# Patient Record
Sex: MALE | Race: WHITE | NOT HISPANIC OR LATINO | URBAN - METROPOLITAN AREA
[De-identification: names, ages, dates, MRNs, and addresses within clinical notes are randomized per-mention and may not be internally consistent; named-entity substitution may affect disease eponyms.]

---

## 2017-11-07 RX ORDER — HALOPERIDOL DECANOATE 100 MG/ML
200 INJECTION INTRAMUSCULAR
Qty: 0 | Refills: 0 | DISCHARGE
Start: 2017-11-07

## 2019-05-17 ENCOUNTER — INPATIENT (INPATIENT)
Facility: HOSPITAL | Age: 39
LOS: 6 days | Discharge: HOME | End: 2019-05-24
Attending: PSYCHIATRY & NEUROLOGY | Admitting: PSYCHIATRY & NEUROLOGY
Payer: MEDICAID

## 2019-05-17 VITALS
HEIGHT: 74 IN | TEMPERATURE: 98 F | OXYGEN SATURATION: 100 % | WEIGHT: 220.02 LBS | DIASTOLIC BLOOD PRESSURE: 62 MMHG | SYSTOLIC BLOOD PRESSURE: 138 MMHG | RESPIRATION RATE: 14 BRPM

## 2019-05-17 DIAGNOSIS — R69 ILLNESS, UNSPECIFIED: ICD-10-CM

## 2019-05-17 DIAGNOSIS — F14.10 COCAINE ABUSE, UNCOMPLICATED: ICD-10-CM

## 2019-05-17 DIAGNOSIS — F10.920 ALCOHOL USE, UNSPECIFIED WITH INTOXICATION, UNCOMPLICATED: ICD-10-CM

## 2019-05-17 DIAGNOSIS — F29 UNSPECIFIED PSYCHOSIS NOT DUE TO A SUBSTANCE OR KNOWN PHYSIOLOGICAL CONDITION: ICD-10-CM

## 2019-05-17 DIAGNOSIS — F12.10 CANNABIS ABUSE, UNCOMPLICATED: ICD-10-CM

## 2019-05-17 LAB
ALBUMIN SERPL ELPH-MCNC: 3.9 G/DL — SIGNIFICANT CHANGE UP (ref 3.3–5)
ALP SERPL-CCNC: 58 U/L — SIGNIFICANT CHANGE UP (ref 40–120)
ALT FLD-CCNC: 18 U/L — SIGNIFICANT CHANGE UP (ref 12–78)
AMPHET UR-MCNC: NEGATIVE — SIGNIFICANT CHANGE UP
ANION GAP SERPL CALC-SCNC: 6 MMOL/L — SIGNIFICANT CHANGE UP (ref 5–17)
APAP SERPL-MCNC: < 2 UG/ML (ref 10–30)
AST SERPL-CCNC: 13 U/L — LOW (ref 15–37)
BARBITURATES UR SCN-MCNC: NEGATIVE — SIGNIFICANT CHANGE UP
BASOPHILS # BLD AUTO: 0.08 K/UL — SIGNIFICANT CHANGE UP (ref 0–0.2)
BASOPHILS NFR BLD AUTO: 0.8 % — SIGNIFICANT CHANGE UP (ref 0–2)
BENZODIAZ UR-MCNC: NEGATIVE — SIGNIFICANT CHANGE UP
BILIRUB SERPL-MCNC: 0.2 MG/DL — SIGNIFICANT CHANGE UP (ref 0.2–1.2)
BUN SERPL-MCNC: 8 MG/DL — SIGNIFICANT CHANGE UP (ref 7–23)
CALCIUM SERPL-MCNC: 8.6 MG/DL — SIGNIFICANT CHANGE UP (ref 8.5–10.1)
CHLORIDE SERPL-SCNC: 114 MMOL/L — HIGH (ref 96–108)
CO2 SERPL-SCNC: 22 MMOL/L — SIGNIFICANT CHANGE UP (ref 22–31)
COCAINE METAB.OTHER UR-MCNC: NEGATIVE — SIGNIFICANT CHANGE UP
CREAT SERPL-MCNC: 1.14 MG/DL — SIGNIFICANT CHANGE UP (ref 0.5–1.3)
EOSINOPHIL # BLD AUTO: 0.31 K/UL — SIGNIFICANT CHANGE UP (ref 0–0.5)
EOSINOPHIL NFR BLD AUTO: 3.2 % — SIGNIFICANT CHANGE UP (ref 0–6)
ETHANOL SERPL-MCNC: 213 MG/DL — HIGH (ref 0–10)
GLUCOSE SERPL-MCNC: 90 MG/DL — SIGNIFICANT CHANGE UP (ref 70–99)
HCT VFR BLD CALC: 38.6 % — LOW (ref 39–50)
HGB BLD-MCNC: 12.7 G/DL — LOW (ref 13–17)
IMM GRANULOCYTES NFR BLD AUTO: 0.2 % — SIGNIFICANT CHANGE UP (ref 0–1.5)
LYMPHOCYTES # BLD AUTO: 1.75 K/UL — SIGNIFICANT CHANGE UP (ref 1–3.3)
LYMPHOCYTES # BLD AUTO: 18.1 % — SIGNIFICANT CHANGE UP (ref 13–44)
MCHC RBC-ENTMCNC: 30.2 PG — SIGNIFICANT CHANGE UP (ref 27–34)
MCHC RBC-ENTMCNC: 32.9 GM/DL — SIGNIFICANT CHANGE UP (ref 32–36)
MCV RBC AUTO: 91.7 FL — SIGNIFICANT CHANGE UP (ref 80–100)
METHADONE UR-MCNC: NEGATIVE — SIGNIFICANT CHANGE UP
MONOCYTES # BLD AUTO: 0.41 K/UL — SIGNIFICANT CHANGE UP (ref 0–0.9)
MONOCYTES NFR BLD AUTO: 4.2 % — SIGNIFICANT CHANGE UP (ref 2–14)
NEUTROPHILS # BLD AUTO: 7.11 K/UL — SIGNIFICANT CHANGE UP (ref 1.8–7.4)
NEUTROPHILS NFR BLD AUTO: 73.5 % — SIGNIFICANT CHANGE UP (ref 43–77)
OPIATES UR-MCNC: NEGATIVE — SIGNIFICANT CHANGE UP
PCP SPEC-MCNC: SIGNIFICANT CHANGE UP
PCP UR-MCNC: NEGATIVE — SIGNIFICANT CHANGE UP
PLATELET # BLD AUTO: 278 K/UL — SIGNIFICANT CHANGE UP (ref 150–400)
POTASSIUM SERPL-MCNC: 3.8 MMOL/L — SIGNIFICANT CHANGE UP (ref 3.5–5.3)
POTASSIUM SERPL-SCNC: 3.8 MMOL/L — SIGNIFICANT CHANGE UP (ref 3.5–5.3)
PROT SERPL-MCNC: 6.8 GM/DL — SIGNIFICANT CHANGE UP (ref 6–8.3)
RBC # BLD: 4.21 M/UL — SIGNIFICANT CHANGE UP (ref 4.2–5.8)
RBC # FLD: 13 % — SIGNIFICANT CHANGE UP (ref 10.3–14.5)
SALICYLATES SERPL-MCNC: 12.6 MG/DL — SIGNIFICANT CHANGE UP (ref 2.8–20)
SODIUM SERPL-SCNC: 142 MMOL/L — SIGNIFICANT CHANGE UP (ref 135–145)
THC UR QL: NEGATIVE — SIGNIFICANT CHANGE UP
WBC # BLD: 9.68 K/UL — SIGNIFICANT CHANGE UP (ref 3.8–10.5)
WBC # FLD AUTO: 9.68 K/UL — SIGNIFICANT CHANGE UP (ref 3.8–10.5)

## 2019-05-17 PROCEDURE — 99285 EMERGENCY DEPT VISIT HI MDM: CPT

## 2019-05-17 PROCEDURE — 90792 PSYCH DIAG EVAL W/MED SRVCS: CPT

## 2019-05-17 PROCEDURE — 93010 ELECTROCARDIOGRAM REPORT: CPT

## 2019-05-17 RX ORDER — NICOTINE POLACRILEX 2 MG
1 GUM BUCCAL ONCE
Refills: 0 | Status: COMPLETED | OUTPATIENT
Start: 2019-05-17 | End: 2019-05-17

## 2019-05-17 RX ORDER — TRIHEXYPHENIDYL HCL 2 MG
2 TABLET ORAL
Refills: 0 | Status: DISCONTINUED | OUTPATIENT
Start: 2019-05-17 | End: 2019-05-24

## 2019-05-17 RX ORDER — LITHIUM CARBONATE 300 MG/1
300 TABLET, EXTENDED RELEASE ORAL
Refills: 0 | Status: DISCONTINUED | OUTPATIENT
Start: 2019-05-17 | End: 2019-05-24

## 2019-05-17 RX ORDER — HYDROXYZINE HCL 10 MG
50 TABLET ORAL ONCE
Refills: 0 | Status: COMPLETED | OUTPATIENT
Start: 2019-05-17 | End: 2019-05-17

## 2019-05-17 RX ORDER — HALOPERIDOL DECANOATE 100 MG/ML
5 INJECTION INTRAMUSCULAR EVERY 6 HOURS
Refills: 0 | Status: DISCONTINUED | OUTPATIENT
Start: 2019-05-17 | End: 2019-05-24

## 2019-05-17 RX ORDER — OLANZAPINE 15 MG/1
20 TABLET, FILM COATED ORAL AT BEDTIME
Refills: 0 | Status: DISCONTINUED | OUTPATIENT
Start: 2019-05-17 | End: 2019-05-21

## 2019-05-17 RX ADMIN — OLANZAPINE 20 MILLIGRAM(S): 15 TABLET, FILM COATED ORAL at 22:24

## 2019-05-17 RX ADMIN — Medication 2 MILLIGRAM(S): at 22:24

## 2019-05-17 RX ADMIN — LITHIUM CARBONATE 300 MILLIGRAM(S): 300 TABLET, EXTENDED RELEASE ORAL at 22:24

## 2019-05-17 RX ADMIN — Medication 50 MILLIGRAM(S): at 17:47

## 2019-05-17 RX ADMIN — Medication 1 PATCH: at 15:50

## 2019-05-17 NOTE — ED PROVIDER NOTE - CONSTITUTIONAL, MLM
normal... Tangential, paranoid, well nourished, awake, alert, oriented to person, place, time/situation and in no apparent distress.
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
## 2019-05-17 NOTE — ED PROVIDER NOTE - OBJECTIVE STATEMENT
38 y/o male with a PMHx of asthma, anxiety, depression, emphysema, paranoid behavior, SI, presents to the ED c/o SI/HI. +EtOH use and marijuana use PTA. Pt notes he is paranoid. Pt notes he feels like people are watching. No other complaints at this time.

## 2019-05-17 NOTE — ED ADULT TRIAGE NOTE - CHIEF COMPLAINT QUOTE
Pt ambulatory to ED and states, "I am going to kill myself or someone else". Pt states he drank ETOH and smoked marijuana PTA. Called for 1:1 and main bed  Pt flags as V3 in computer upon registration and security notified

## 2019-05-17 NOTE — ED BEHAVIORAL HEALTH ASSESSMENT NOTE - RISK ASSESSMENT
Risk factor, substance abuse, h/o aggression and SI, h/o noncompliance, legal abuse, trauma, SA, white.  Protective factors: on AOT with ACT team.  Help seeking.

## 2019-05-17 NOTE — ED ADULT NURSE NOTE - OBJECTIVE STATEMENT
Patient with AOB states he wants to kill himself and states that he would shoot himself to end his life. patient admits that he wants to harm other people but no one in the emergency department. Patient states he has come here for help and wants to be admitted.

## 2019-05-17 NOTE — ED BEHAVIORAL HEALTH ASSESSMENT NOTE - CURRENT MEDICATION
per Pt., unconfirmed with ACT team, awaiting return call  Zyprexa 15 mg am and 20 mg hs, Artane 2mg am and 2 mg hs, Lithium 300 q12, Neurontin 700 am and 700 pm
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
## 2019-05-17 NOTE — ED BEHAVIORAL HEALTH ASSESSMENT NOTE - OTHER PAST PSYCHIATRIC HISTORY (INCLUDE DETAILS REGARDING ONSET, COURSE OF ILLNESS, INPATIENT/OUTPATIENT TREATMENT)
ACT Team Errol??, message left for return call    (Pt reported was [facility] but they have no record of him there)  [facility] noted as contact who was contacted bu number updated and no longer in chart.  >100 psych admissions including 1 year at [facility] in 2016, now on AOT

## 2019-05-17 NOTE — ED BEHAVIORAL HEALTH ASSESSMENT NOTE - DESCRIPTION
Cooperative, watching TV, no acute distress or behavioral issues. Heart Murmur, Fx rib age30 single, lives in room with mother, multiple arrents and incarcerations, Attended Jordi CUTLER

## 2019-05-17 NOTE — ED ADULT NURSE NOTE - NSIMPLEMENTINTERV_GEN_ALL_ED
Implemented All Universal Safety Interventions:  [place] to call system. Call bell, personal items and telephone within reach. Instruct patient to call for assistance. Room bathroom lighting operational. Non-slip footwear when patient is off stretcher. Physically safe environment: no spills, clutter or unnecessary equipment. Stretcher in lowest position, wheels locked, appropriate side rails in place.

## 2019-05-17 NOTE — ED BEHAVIORAL HEALTH ASSESSMENT NOTE - NS ED BHA PLAN PSYCHIATRIC ISSUES2 FT
will restart zyprexa, lithium. haldol/ativan prn agitation. primary team may consider restarting clozaril in lieu of zyprexa

## 2019-05-18 LAB
CHOLEST SERPL-MCNC: 132 MG/DL — SIGNIFICANT CHANGE UP (ref 10–199)
HBA1C BLD-MCNC: 4.7 % — SIGNIFICANT CHANGE UP (ref 4–5.6)
HDLC SERPL-MCNC: 76 MG/DL — SIGNIFICANT CHANGE UP
LIPID PNL WITH DIRECT LDL SERPL: 48 MG/DL — SIGNIFICANT CHANGE UP
TOTAL CHOLESTEROL/HDL RATIO MEASUREMENT: 1.7 RATIO — LOW (ref 3.4–9.6)
TRIGL SERPL-MCNC: 40 MG/DL — SIGNIFICANT CHANGE UP (ref 10–149)

## 2019-05-18 PROCEDURE — 99222 1ST HOSP IP/OBS MODERATE 55: CPT

## 2019-05-18 RX ORDER — IPRATROPIUM/ALBUTEROL SULFATE 18-103MCG
3 AEROSOL WITH ADAPTER (GRAM) INHALATION EVERY 6 HOURS
Refills: 0 | Status: DISCONTINUED | OUTPATIENT
Start: 2019-05-18 | End: 2019-05-24

## 2019-05-18 RX ORDER — THIAMINE MONONITRATE (VIT B1) 100 MG
100 TABLET ORAL DAILY
Refills: 0 | Status: DISCONTINUED | OUTPATIENT
Start: 2019-05-18 | End: 2019-05-24

## 2019-05-18 RX ORDER — CLOZAPINE 150 MG/1
25 TABLET, ORALLY DISINTEGRATING ORAL AT BEDTIME
Refills: 0 | Status: COMPLETED | OUTPATIENT
Start: 2019-05-18 | End: 2019-05-18

## 2019-05-18 RX ORDER — FOLIC ACID 0.8 MG
1 TABLET ORAL DAILY
Refills: 0 | Status: DISCONTINUED | OUTPATIENT
Start: 2019-05-18 | End: 2019-05-24

## 2019-05-18 RX ORDER — CLOZAPINE 150 MG/1
50 TABLET, ORALLY DISINTEGRATING ORAL AT BEDTIME
Refills: 0 | Status: DISCONTINUED | OUTPATIENT
Start: 2019-05-19 | End: 2019-05-20

## 2019-05-18 RX ADMIN — Medication 2 MILLIGRAM(S): at 18:09

## 2019-05-18 RX ADMIN — Medication 2 MILLIGRAM(S): at 20:33

## 2019-05-18 RX ADMIN — LITHIUM CARBONATE 300 MILLIGRAM(S): 300 TABLET, EXTENDED RELEASE ORAL at 09:58

## 2019-05-18 RX ADMIN — OLANZAPINE 20 MILLIGRAM(S): 15 TABLET, FILM COATED ORAL at 23:30

## 2019-05-18 RX ADMIN — Medication 2 MILLIGRAM(S): at 09:58

## 2019-05-18 RX ADMIN — Medication 1 MILLIGRAM(S): at 10:25

## 2019-05-18 RX ADMIN — CLOZAPINE 25 MILLIGRAM(S): 150 TABLET, ORALLY DISINTEGRATING ORAL at 20:34

## 2019-05-18 RX ADMIN — Medication 2 MILLIGRAM(S): at 14:52

## 2019-05-18 RX ADMIN — HALOPERIDOL DECANOATE 5 MILLIGRAM(S): 100 INJECTION INTRAMUSCULAR at 14:52

## 2019-05-18 RX ADMIN — HALOPERIDOL DECANOATE 5 MILLIGRAM(S): 100 INJECTION INTRAMUSCULAR at 20:32

## 2019-05-18 RX ADMIN — LITHIUM CARBONATE 300 MILLIGRAM(S): 300 TABLET, EXTENDED RELEASE ORAL at 18:09

## 2019-05-18 NOTE — PROGRESS NOTE BEHAVIORAL HEALTH - NSBHFUPINTERVALHXFT_PSY_A_CORE
Patient a 38 y/o single  male, unemployed, domiciled with mother, unemployed, on SSI, hx of Schizophrenia/SAD, multiple past In-Patient Psychiatric admissions, including [facility] Admission for 8 months, hx of Polysubstance abuse, was BIB/Self due to disorganized behavior.    He endorsed that he woke up, did not like what he felt, took a bus as he was also hearing voices, and thus was unable to care for self so he took a bus and came here. He endorsed that he drinks Alcohol daily and he drinks to help him lower his voices, he hears voices all the time, telling him derogatory things, alcohol reduces the intensity of the voices, but it continues to persist. He also smoked Cocaine, last cocaine use was 2 days earlier, smokes cocaine once a week, and continues to say that he is paranoid and suspicious, paranoid to the point that he feels everything is after him, " I can't pass around the neighborhood, people talking about me, I hear what they say, I feel that I am in a movie show as if everybody talking  about me. He further added that he always feel there are aliens around him, and they have the same flesh  as us etc. he was hospitalized multiple times in the past. he added that the only meds helps him is Clozapine, Clozapine started on 25 mg HS for today and was increased to Clozapine 50 mg tomorrow.
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
## 2019-05-18 NOTE — H&P ADULT - NSHPPHYSICALEXAM_GEN_ALL_CORE
Vital Signs Last 24 Hrs  T(C): 37 (18 May 2019 16:36), Max: 37 (18 May 2019 07:58)  T(F): 98.6 (18 May 2019 16:36), Max: 98.6 (18 May 2019 07:58)  HR: 83 (18 May 2019 20:13) (75 - 96)  BP: 122/82 (18 May 2019 20:13) (122/81 - 122/82)  BP(mean): 91 (18 May 2019 20:13) (90 - 91)  RR: 16 (18 May 2019 20:13) (16 - 16)  SpO2: 98% (18 May 2019 20:13) (98% - 100%)

## 2019-05-18 NOTE — PROGRESS NOTE BEHAVIORAL HEALTH - NSBHFUPADDHPIFT_PSY_A_CORE
Patient a 38 y/o single  male, unemployed, domiciled with mother, unemployed, on SSI, hx of Schizophrenia/SAD, multiple past In-Patient Psychiatric admissions, including [facility] Admission for 8 months, hx of Polysubstance abuse, was BIB/Self due to disorganized behavior.    He endorsed that he woke up, did not like what he felt, took a bus as he was also hearing voices, and thus was unable to care for self so he took a bus and came here. He endorsed that he drinks Alcohol daily and he drinks to help him lower his voices, he hears voices all the time, telling him derogatory things, alcohol reduces the intensity of the voices, but it continues to persist. He also smoked Cocaine, last cocaine use was 2 days earlier, smokes cocaine once a week, and continues to say that he is paranoid and suspicious, paranoid to the point that he feels everything is after him, " I can't pass around the neighborhood, people talking about me, I hear what they say, I feel that I am in a movie show as if everybody talking  about me. He further added that he always feel there are aliens around him, and they have the same flesh  as us etc. he was hospitalized multiple times in the past. he added that the only meds helps him is Clozapine, Clozapine started on 25 mg HS for today and was increased to Clozapine 50 mg tomorrow.
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
## 2019-05-18 NOTE — H&P ADULT - ATTENDING COMMENTS
40 yo M with PMH significant for Paranoid schizophrenia, schizoaffective disorder, Alcohol abuse, cocaine use, THC use, Asthma admitted to [facility] for eval. of Agitation, paranoia, SI and HI. Pt is resting comfortably in bed. Denies headache, dizziness chest pain, palpitations or SOB. Denies fever, chills, cough, dysuria or diarrhea. On arrival BAL was 213. Pt reported that He drinks 12-14 beers per day. Pt reported Hx of DT's in the past. Medicine consult is called for medical Mx. Pt was seen and examined in presence of Night Nursing Assistant. Pt denies any past Hx of chest pain, angina, MI or CAD.   ROS: as above.  On exam: as above.     A/P:    # Schizophrenia/Schizoaffective disorder/ Paranoia/SI/HI/Psych problems  - Manage as per primary psych team    # Anemia likely 2/2 to alcohol use  - H/H: 12.7/38.6  - Folate and Vitamin b12 level  - Denies any GI or  bleeding    # Hx of Alcohol abuse  - On CIWA protocol, ordered by Psych team  - MVI/Thiamine/FA  _ NO s/s of withdrawal at present    # Hx of Cocaine use  - Denies nay chest pain, palpitations or SOB.  - Counselled on cessation, education provided  - EKG NSR    # Hx of THC use  - Counselled on cessation, education provided    # Hx of Asthma  - Stable  - PRN nebs    # DVT Ppx  - Encourage Ambulation

## 2019-05-18 NOTE — H&P ADULT - ASSESSMENT
40 yo M with PMH significant for Paranoid schizophrenia, schizoaffective disorder, Alcohol abuse, cocaine use, THC use, Asthma admitted to [facility] for eval. of Agitation, paranoia, SI and HI.     # Schizophrenia/Schizoaffective disorder/ Paranoia/SI/HI/Psych problems  - Manage as per primary psych team    # Anemia likely 2/2 to alcohol use  - H/H: 12.7/38.6  - Folate and Vitamin b12 level  - Denies any GI or  bleeding    # Hx of Alcohol abuse  - On CIWA protocol, ordered by Psych team  - MVI/Thiamine/FA  _ NO s/s of withdrawal at present    # Hx of Cocaine use  - Denies nay chest pain, palpitations or SOB.  - Counselled on cessation, education provided  - EKG NSR    # Hx of THC use  - Counselled on cessation, education provided    # Hx of Asthma  - Stable  - PRN nebs    # DVT Ppx  - Encourage Ambulation    IMPROVE VTE Individual Risk Assessment    RISK                                                                Points    [  ] Previous VTE                                                  3    [  ] Thrombophilia                                               2    [  ] Lower limb paralysis                                      2        (unable to hold up >15 seconds)      [  ] Current Cancer                                              2         (within 6 months)    [  ] Immobilization > 24 hrs                                1    [  ] ICU/CCU stay > 24 hours                              1    [  ] Age > 60                                                      1    IMPROVE VTE Score ____0_____    IMPROVE Score 0-1: Low Risk, No VTE prophylaxis required for most patients, encourage ambulation.   IMPROVE Score 2-3: At risk, pharmacologic VTE prophylaxis is indicated for most patients (in the absence of a contraindication)  IMPROVE Score > or = 4: High Risk, pharmacologic VTE prophylaxis is indicated for most patients (in the absence of a contraindication)      Case d/w Dr. Victoria

## 2019-05-19 LAB
FOLATE SERPL-MCNC: 16.7 NG/ML — SIGNIFICANT CHANGE UP
VIT B12 SERPL-MCNC: 576 PG/ML — SIGNIFICANT CHANGE UP (ref 232–1245)

## 2019-05-19 PROCEDURE — 99231 SBSQ HOSP IP/OBS SF/LOW 25: CPT

## 2019-05-19 RX ADMIN — OLANZAPINE 20 MILLIGRAM(S): 15 TABLET, FILM COATED ORAL at 22:38

## 2019-05-19 RX ADMIN — HALOPERIDOL DECANOATE 5 MILLIGRAM(S): 100 INJECTION INTRAMUSCULAR at 22:33

## 2019-05-19 RX ADMIN — Medication 2 MILLIGRAM(S): at 17:17

## 2019-05-19 RX ADMIN — LITHIUM CARBONATE 300 MILLIGRAM(S): 300 TABLET, EXTENDED RELEASE ORAL at 17:17

## 2019-05-19 RX ADMIN — CLOZAPINE 50 MILLIGRAM(S): 150 TABLET, ORALLY DISINTEGRATING ORAL at 22:36

## 2019-05-19 RX ADMIN — HALOPERIDOL DECANOATE 5 MILLIGRAM(S): 100 INJECTION INTRAMUSCULAR at 16:33

## 2019-05-19 RX ADMIN — Medication 100 MILLIGRAM(S): at 09:25

## 2019-05-19 RX ADMIN — Medication 30 MILLILITER(S): at 17:19

## 2019-05-19 RX ADMIN — Medication 2 MILLIGRAM(S): at 22:34

## 2019-05-19 RX ADMIN — Medication 2 MILLIGRAM(S): at 09:25

## 2019-05-19 RX ADMIN — Medication 2 MILLIGRAM(S): at 16:33

## 2019-05-19 RX ADMIN — LITHIUM CARBONATE 300 MILLIGRAM(S): 300 TABLET, EXTENDED RELEASE ORAL at 09:25

## 2019-05-19 RX ADMIN — Medication 1 TABLET(S): at 09:25

## 2019-05-19 RX ADMIN — Medication 1 MILLIGRAM(S): at 13:39

## 2019-05-19 RX ADMIN — Medication 1 MILLIGRAM(S): at 09:25

## 2019-05-19 RX ADMIN — HALOPERIDOL DECANOATE 5 MILLIGRAM(S): 100 INJECTION INTRAMUSCULAR at 09:25

## 2019-05-19 NOTE — PROGRESS NOTE BEHAVIORAL HEALTH - NSBHFUPINTERVALHXFT_PSY_A_CORE
Patient a 40 y/o single  male, unemployed, domiciled with mother, unemployed, on SSI, hx of Schizophrenia/SAD, multiple past In-Patient Psychiatric admissions, [facility], hx of Polysubstance abuse, was BIB/Self due to disorganized behavior.    Patient was seen today AM. he looks confused, and limited and quiet, he received PRN PO meds for stability. Later after 1-2 hour, he was sweaty, sitting on a stool near the phone, and was given a dose of extra ativan 1 mg for stability. He continues to endorse that he is paranoid , suspicious and feels that he is being followed and has less sleep.     Plan: To continue Clozapine 50 mg HS and titrate as needed and need to register in Clozapine [facility]          To slowly taper off Zyprexa as needed for stability.

## 2019-05-20 DIAGNOSIS — F20.0 PARANOID SCHIZOPHRENIA: ICD-10-CM

## 2019-05-20 PROCEDURE — 99232 SBSQ HOSP IP/OBS MODERATE 35: CPT

## 2019-05-20 RX ORDER — CLOZAPINE 150 MG/1
25 TABLET, ORALLY DISINTEGRATING ORAL DAILY
Refills: 0 | Status: DISCONTINUED | OUTPATIENT
Start: 2019-05-21 | End: 2019-05-21

## 2019-05-20 RX ORDER — TRAZODONE HCL 50 MG
50 TABLET ORAL AT BEDTIME
Refills: 0 | Status: DISCONTINUED | OUTPATIENT
Start: 2019-05-20 | End: 2019-05-22

## 2019-05-20 RX ORDER — NICOTINE POLACRILEX 2 MG
2 GUM BUCCAL EVERY 4 HOURS
Refills: 0 | Status: DISCONTINUED | OUTPATIENT
Start: 2019-05-20 | End: 2019-05-24

## 2019-05-20 RX ORDER — CLOZAPINE 150 MG/1
75 TABLET, ORALLY DISINTEGRATING ORAL AT BEDTIME
Refills: 0 | Status: DISCONTINUED | OUTPATIENT
Start: 2019-05-20 | End: 2019-05-21

## 2019-05-20 RX ORDER — NICOTINE POLACRILEX 2 MG
1 GUM BUCCAL DAILY
Refills: 0 | Status: DISCONTINUED | OUTPATIENT
Start: 2019-05-20 | End: 2019-05-24

## 2019-05-20 RX ADMIN — Medication 100 MILLIGRAM(S): at 08:04

## 2019-05-20 RX ADMIN — Medication 2 MILLIGRAM(S): at 15:31

## 2019-05-20 RX ADMIN — Medication 2 MILLIGRAM(S): at 10:23

## 2019-05-20 RX ADMIN — LITHIUM CARBONATE 300 MILLIGRAM(S): 300 TABLET, EXTENDED RELEASE ORAL at 06:39

## 2019-05-20 RX ADMIN — LITHIUM CARBONATE 300 MILLIGRAM(S): 300 TABLET, EXTENDED RELEASE ORAL at 21:08

## 2019-05-20 RX ADMIN — Medication 2 MILLIGRAM(S): at 06:39

## 2019-05-20 RX ADMIN — Medication 1 TABLET(S): at 08:04

## 2019-05-20 RX ADMIN — HALOPERIDOL DECANOATE 5 MILLIGRAM(S): 100 INJECTION INTRAMUSCULAR at 11:28

## 2019-05-20 RX ADMIN — Medication 30 MILLILITER(S): at 13:11

## 2019-05-20 RX ADMIN — Medication 1 MILLIGRAM(S): at 08:04

## 2019-05-20 RX ADMIN — OLANZAPINE 20 MILLIGRAM(S): 15 TABLET, FILM COATED ORAL at 21:08

## 2019-05-20 RX ADMIN — HALOPERIDOL DECANOATE 5 MILLIGRAM(S): 100 INJECTION INTRAMUSCULAR at 15:32

## 2019-05-20 RX ADMIN — Medication 2 MILLIGRAM(S): at 21:07

## 2019-05-20 RX ADMIN — CLOZAPINE 75 MILLIGRAM(S): 150 TABLET, ORALLY DISINTEGRATING ORAL at 21:07

## 2019-05-20 NOTE — PROGRESS NOTE BEHAVIORAL HEALTH - NSBHFUPINTERVALHXFT_PSY_A_CORE
Patient was isolative today, stating to have heard voices to hurt self this morning, (denies suicidal ideation/intent/plan) feels paranoid as though something bad is going to happen to him. Reports history of being well maintained on Clozapine, stating hoping to be restabilized on it again. Denies visual hallucinations. Denies manic or depressive symptoms. Reports anxiety relate to paranoia however stating not needing anxiolytic. Denies assault ideation/intent/plan or homicidal ideation/intent/plan. Requesting nicotine patch and gum secondary to nicotine use. Requests trazodone for sleep. Denies other concerns.

## 2019-05-20 NOTE — PROGRESS NOTE BEHAVIORAL HEALTH - PROBLEM SELECTOR PLAN 1
Clozapine 75 mg at bedtime (plan to cross titrate)  Zyprexa 20 mg at bedtime (plan to cross taper) Clozapine 75 mg at bedtime (plan to cross titrate)  Zyprexa 20 mg at bedtime (plan to cross taper)  [place] [facility]

## 2019-05-21 PROCEDURE — 99232 SBSQ HOSP IP/OBS MODERATE 35: CPT

## 2019-05-21 RX ORDER — CLOZAPINE 150 MG/1
50 TABLET, ORALLY DISINTEGRATING ORAL DAILY
Refills: 0 | Status: DISCONTINUED | OUTPATIENT
Start: 2019-05-22 | End: 2019-05-22

## 2019-05-21 RX ORDER — CLOZAPINE 150 MG/1
100 TABLET, ORALLY DISINTEGRATING ORAL AT BEDTIME
Refills: 0 | Status: DISCONTINUED | OUTPATIENT
Start: 2019-05-21 | End: 2019-05-22

## 2019-05-21 RX ORDER — OLANZAPINE 15 MG/1
15 TABLET, FILM COATED ORAL AT BEDTIME
Refills: 0 | Status: DISCONTINUED | OUTPATIENT
Start: 2019-05-21 | End: 2019-05-22

## 2019-05-21 RX ADMIN — Medication 2 MILLIGRAM(S): at 20:34

## 2019-05-21 RX ADMIN — Medication 1 TABLET(S): at 09:41

## 2019-05-21 RX ADMIN — CLOZAPINE 25 MILLIGRAM(S): 150 TABLET, ORALLY DISINTEGRATING ORAL at 09:40

## 2019-05-21 RX ADMIN — Medication 2 MILLIGRAM(S): at 13:10

## 2019-05-21 RX ADMIN — LITHIUM CARBONATE 300 MILLIGRAM(S): 300 TABLET, EXTENDED RELEASE ORAL at 09:40

## 2019-05-21 RX ADMIN — CLOZAPINE 100 MILLIGRAM(S): 150 TABLET, ORALLY DISINTEGRATING ORAL at 20:36

## 2019-05-21 RX ADMIN — Medication 2 MILLIGRAM(S): at 09:28

## 2019-05-21 RX ADMIN — LITHIUM CARBONATE 300 MILLIGRAM(S): 300 TABLET, EXTENDED RELEASE ORAL at 20:35

## 2019-05-21 RX ADMIN — HALOPERIDOL DECANOATE 5 MILLIGRAM(S): 100 INJECTION INTRAMUSCULAR at 10:11

## 2019-05-21 RX ADMIN — OLANZAPINE 15 MILLIGRAM(S): 15 TABLET, FILM COATED ORAL at 20:34

## 2019-05-21 RX ADMIN — LITHIUM CARBONATE 300 MILLIGRAM(S): 300 TABLET, EXTENDED RELEASE ORAL at 09:41

## 2019-05-21 RX ADMIN — Medication 2 MILLIGRAM(S): at 09:27

## 2019-05-21 RX ADMIN — Medication 1 PATCH: at 09:27

## 2019-05-21 RX ADMIN — Medication 100 MILLIGRAM(S): at 09:26

## 2019-05-21 RX ADMIN — Medication 1 MILLIGRAM(S): at 09:27

## 2019-05-21 NOTE — PROGRESS NOTE BEHAVIORAL HEALTH - PROBLEM SELECTOR PLAN 1
Clozapine 50 mg in the morning and 100 mg at bedtime (plan to cross titrate)   Zyprexa 15 mg at bedtime (plan to cross taper)  [place] [facility]

## 2019-05-21 NOTE — PROGRESS NOTE BEHAVIORAL HEALTH - NSBHFUPINTERVALHXFT_PSY_A_CORE
Patient remains isolative, not attending group; stating to hearing voices but not as loud and not commanding. Denies suicidal ideation/intent/plan. Feels paranoid as though something bad is going to happen to him. Reports history of being well maintained on Clozapine, stating hoping to be restabilized on it again; asking for continual titration to about 200-300 mg twice daily before being discharged; wants to come of Zyprexa. Denies visual hallucinations. Denies manic or depressive symptoms. Reports anxiety relate to paranoia however stating not needing anxiolytic. Denies assault ideation/intent/plan or homicidal ideation/intent/plan. Requesting nicotine patch and gum secondary to nicotine use. Reports not wanting trazodone any more stating it is too sedating. Denies other concerns.

## 2019-05-22 DIAGNOSIS — F10.10 ALCOHOL ABUSE, UNCOMPLICATED: ICD-10-CM

## 2019-05-22 DIAGNOSIS — F41.9 ANXIETY DISORDER, UNSPECIFIED: ICD-10-CM

## 2019-05-22 PROCEDURE — 99232 SBSQ HOSP IP/OBS MODERATE 35: CPT

## 2019-05-22 RX ORDER — OLANZAPINE 15 MG/1
20 TABLET, FILM COATED ORAL AT BEDTIME
Refills: 0 | Status: DISCONTINUED | OUTPATIENT
Start: 2019-05-23 | End: 2019-05-24

## 2019-05-22 RX ORDER — CLOZAPINE 150 MG/1
25 TABLET, ORALLY DISINTEGRATING ORAL AT BEDTIME
Refills: 0 | Status: DISCONTINUED | OUTPATIENT
Start: 2019-05-22 | End: 2019-05-22

## 2019-05-22 RX ORDER — CLOZAPINE 150 MG/1
25 TABLET, ORALLY DISINTEGRATING ORAL
Refills: 0 | Status: COMPLETED | OUTPATIENT
Start: 2019-05-22 | End: 2019-05-23

## 2019-05-22 RX ORDER — OLANZAPINE 15 MG/1
10 TABLET, FILM COATED ORAL AT BEDTIME
Refills: 0 | Status: DISCONTINUED | OUTPATIENT
Start: 2019-05-22 | End: 2019-05-22

## 2019-05-22 RX ORDER — DIPHENHYDRAMINE HCL 50 MG
50 CAPSULE ORAL AT BEDTIME
Refills: 0 | Status: DISCONTINUED | OUTPATIENT
Start: 2019-05-22 | End: 2019-05-24

## 2019-05-22 RX ORDER — LANOLIN ALCOHOL/MO/W.PET/CERES
5 CREAM (GRAM) TOPICAL AT BEDTIME
Refills: 0 | Status: DISCONTINUED | OUTPATIENT
Start: 2019-05-22 | End: 2019-05-24

## 2019-05-22 RX ORDER — OLANZAPINE 15 MG/1
15 TABLET, FILM COATED ORAL ONCE
Refills: 0 | Status: COMPLETED | OUTPATIENT
Start: 2019-05-22 | End: 2019-05-22

## 2019-05-22 RX ADMIN — Medication 2 MILLIGRAM(S): at 22:22

## 2019-05-22 RX ADMIN — CLOZAPINE 25 MILLIGRAM(S): 150 TABLET, ORALLY DISINTEGRATING ORAL at 22:31

## 2019-05-22 RX ADMIN — Medication 100 MILLIGRAM(S): at 09:01

## 2019-05-22 RX ADMIN — OLANZAPINE 15 MILLIGRAM(S): 15 TABLET, FILM COATED ORAL at 22:23

## 2019-05-22 RX ADMIN — Medication 2 MILLIGRAM(S): at 09:01

## 2019-05-22 RX ADMIN — Medication 1 MILLIGRAM(S): at 09:01

## 2019-05-22 RX ADMIN — Medication 1 TABLET(S): at 09:01

## 2019-05-22 RX ADMIN — LITHIUM CARBONATE 300 MILLIGRAM(S): 300 TABLET, EXTENDED RELEASE ORAL at 22:31

## 2019-05-22 RX ADMIN — CLOZAPINE 50 MILLIGRAM(S): 150 TABLET, ORALLY DISINTEGRATING ORAL at 09:01

## 2019-05-22 RX ADMIN — LITHIUM CARBONATE 300 MILLIGRAM(S): 300 TABLET, EXTENDED RELEASE ORAL at 09:01

## 2019-05-22 RX ADMIN — Medication 1 PATCH: at 08:07

## 2019-05-22 RX ADMIN — HALOPERIDOL DECANOATE 5 MILLIGRAM(S): 100 INJECTION INTRAMUSCULAR at 09:02

## 2019-05-22 RX ADMIN — Medication 50 MILLIGRAM(S): at 22:22

## 2019-05-22 RX ADMIN — Medication 2 MILLIGRAM(S): at 13:58

## 2019-05-22 NOTE — PROGRESS NOTE BEHAVIORAL HEALTH - NSBHFUPINTERVALHXFT_PSY_A_CORE
Patient presenting constricted, paranoid however aware of his paranoia, denying assault ideation/intent/plan or homicidal ideation/intent/plan. Reports fair sleep. Reports good appetite. Remains isolative. Denies depressed mood, Denies hopeless and suicidal ideation. Patient has no other psychotic symptoms, denying auditory / visual hallucination. Patient presenting constricted, paranoid however aware of his paranoia, denying assault ideation/intent/plan or homicidal ideation/intent/plan. Reports fair sleep. Reports good appetite. Remains isolative. Denies depressed mood, Denies hopeless and suicidal ideation. Patient has no other psychotic symptoms, denying auditory / visual hallucination.    Spoke to Dave Acevedo - [phone] - who reports not wanting patient being on Clozaril and wants patient back on Zyprexa, and will get Haldol Decanoate 200 mg IM on 5.30. Willing to meet with patient Friday to discuss Clozaril. Patient notified and agreeable, although resisting initially. Clozapine will be tapered off and Zyprexa re-titrated to initial 20 mg dosage.

## 2019-05-22 NOTE — PROGRESS NOTE BEHAVIORAL HEALTH - NSBHCHARTREVIEWLAB_PSY_A_CORE FT
12.7   9.68  )-----------( 278      ( 17 May 2019 13:45 )             38.6   05-17    142  |  114<H>  |  8   ----------------------------<  90  3.8   |  22  |  1.14    Ca    8.6      17 May 2019 13:45    TPro  6.8  /  Alb  3.9  /  TBili  0.2  /  DBili  x   /  AST  13<L>  /  ALT  18  /  AlkPhos  58  05-17

## 2019-05-22 NOTE — PROGRESS NOTE BEHAVIORAL HEALTH - PROBLEM SELECTOR PLAN 1
Clozapine 100 mg in the morning and 100 mg at bedtime (plan to cross titrate)   Zyprexa 10 mg at bedtime (plan to cross taper)  [place] [facility] Clozapine 100 mg in the morning and 100 mg at bedtime (plan to cross titrate)   Zyprexa 10 mg at bedtime (plan to cross taper)  [place] [facility]  Lithium 300 mg twice daily Zyprexa 15 mg at bedtime 5.22 and Zyprexa 20 mg at bedtime 5.23 with plan to discharge patient 5.24  Lithium 300 mg twice daily  Discontinue Clozaril  Haldol Decanoate 200 mg 5.30

## 2019-05-22 NOTE — CHART NOTE - NSCHARTNOTEFT_GEN_A_CORE
Rec'd phone call from Elsa at AOT who advised that pt is actively on AOT and she requested initial clinical. Faxed to her this date. Also tried to return call to Dave Acevedo of [facility] ACT team ( [phone] -direct and office [phone]) to update him on pt's admission and treatment here and to advise of MD janina to d/c him by Friday on a 72 hour letter. Awaiting return call.

## 2019-05-23 PROCEDURE — 99232 SBSQ HOSP IP/OBS MODERATE 35: CPT

## 2019-05-23 RX ADMIN — Medication 50 MILLIGRAM(S): at 21:04

## 2019-05-23 RX ADMIN — LITHIUM CARBONATE 300 MILLIGRAM(S): 300 TABLET, EXTENDED RELEASE ORAL at 09:10

## 2019-05-23 RX ADMIN — OLANZAPINE 20 MILLIGRAM(S): 15 TABLET, FILM COATED ORAL at 21:03

## 2019-05-23 RX ADMIN — Medication 30 MILLILITER(S): at 19:13

## 2019-05-23 RX ADMIN — Medication 100 MILLIGRAM(S): at 09:10

## 2019-05-23 RX ADMIN — Medication 2 MILLIGRAM(S): at 19:13

## 2019-05-23 RX ADMIN — CLOZAPINE 25 MILLIGRAM(S): 150 TABLET, ORALLY DISINTEGRATING ORAL at 09:34

## 2019-05-23 RX ADMIN — LITHIUM CARBONATE 300 MILLIGRAM(S): 300 TABLET, EXTENDED RELEASE ORAL at 21:03

## 2019-05-23 RX ADMIN — Medication 1 MILLIGRAM(S): at 09:10

## 2019-05-23 RX ADMIN — Medication 1 TABLET(S): at 09:10

## 2019-05-23 RX ADMIN — Medication 2 MILLIGRAM(S): at 21:03

## 2019-05-23 RX ADMIN — Medication 2 MILLIGRAM(S): at 09:26

## 2019-05-23 RX ADMIN — Medication 2 MILLIGRAM(S): at 09:10

## 2019-05-23 RX ADMIN — HALOPERIDOL DECANOATE 5 MILLIGRAM(S): 100 INJECTION INTRAMUSCULAR at 19:13

## 2019-05-23 NOTE — DISCHARGE NOTE BEHAVIORAL HEALTH - NSBHDCTHERAPYFT_PSY_A_CORE
Psychosocial assessment  Individual, group and milieu therapies  Pschoeducation r/t to diagnosis and treatment  Discharge planning Psychosocial assessment  Individual, group and milieu therapies  Psycho education r/t to diagnosis and treatment  Discharge planning

## 2019-05-23 NOTE — DISCHARGE NOTE BEHAVIORAL HEALTH - CONDITIONS AT DISCHARGE
Patient was seen and evaluated by treatment team and is discharged.  Patient is alert, ambulatory; no distress noted nor voiced by patient; patient denies current suicidal/homicidal ideation, and denies auditory/visual hallucination.  Discharge and follow-up instructions explained and given to patient and patient verbalized understanding.  All belongings returned.  Patient currently awaiting ride for safe d/c.

## 2019-05-23 NOTE — PROGRESS NOTE BEHAVIORAL HEALTH - PROBLEM SELECTOR PLAN 1
Zyprexa 15 mg at bedtime 5.22 and Zyprexa 20 mg at bedtime 5.23 with plan to discharge patient 5.24  Lithium 300 mg twice daily  Discontinue Clozaril  Haldol Decanoate 200 mg 5.30

## 2019-05-23 NOTE — PROGRESS NOTE BEHAVIORAL HEALTH - NSBHFUPINTERVALHXFT_PSY_A_CORE
PT is back on zyprexa 20mg at HS as requested by ACT team.   No complaints abut side effects. Sleep and appetite are satisfying.  Patient is paranoid as his baseline, however aware of his paranoia, and when asked if he thinks that s [id number] is following him or being after him, he denies. Mood is "OK" Denies SI, HI, AH. VH.  he is casually dressed, remains isolative, cooperative with treatment plan.   Collateral form ACT team: Dave Acevedo - [phone] - PT si due for haldol dec Haldol Decanoate 200 mg IM on 5/30/19.

## 2019-05-23 NOTE — DISCHARGE NOTE BEHAVIORAL HEALTH - NSBHDCMEDSFT_PSY_A_CORE
MEDICATIONS  (STANDING):  lithium 300 milliGRAM(s) Oral two times a day  nicotine - 21 mG/24Hr(s) Patch 1 patch Transdermal daily  OLANZapine 20 milliGRAM(s) Oral at bedtime  trihexyphenidyl 2 milliGRAM(s) Oral two times a day MEDICATIONS  (STANDING):  lithium 300 milliGRAM(s) Oral two times a day  nicotine - 21 mG/24Hr(s) Patch 1 patch Transdermal daily  OLANZapine 20 milliGRAM(s) Oral at bedtime  trihexyphenidyl 2 milliGRAM(s) Oral two times a day    PATIENT EDUCATED TO NOT STOP ANY MEDICATIONS UNLESS OTHERWISE TOLD TO DO SO BY OUTPATIENT PROVIDER.

## 2019-05-23 NOTE — DISCHARGE NOTE BEHAVIORAL HEALTH - NSBHDCCRISISPLAN2FT_PSY_A_CORE
Call Dr. Samuel or ADAM Osuna NPP at [phone]  Call the [facility] Team at [phone]

## 2019-05-23 NOTE — DISCHARGE NOTE BEHAVIORAL HEALTH - NSBHDCTESTSFT_PSY_A_CORE
Cholesterol: 132; Triglycerides: 40; HDL 76; LDL: 48; HgA1c 4.7; Cholesterol: 132; Triglycerides: 40; HDL 76; LDL: 48; HgA1c 4.7; Lithium 0.6

## 2019-05-23 NOTE — DISCHARGE NOTE BEHAVIORAL HEALTH - NSBHDCCRISISPLAN1FT_PSY_A_CORE
Talk to a trusted family member, friend or counselor and ask for help.  Call the Suicide Hotline at [phone]  Call 911 or go to my nearest hospital emergency room

## 2019-05-23 NOTE — DISCHARGE NOTE BEHAVIORAL HEALTH - MEDICATION SUMMARY - MEDICATIONS TO STOP TAKING
I will STOP taking the medications listed below when I get home from the hospital:    traZODone 50 mg oral tablet  -- 1 tab(s) by mouth once a day (at bedtime)    Ativan  -- 2 milligram(s) by mouth every 8 hours, As Needed    gabapentin  -- 700 milligram(s) by mouth 3 times a day

## 2019-05-23 NOTE — CHART NOTE - NSCHARTNOTEFT_GEN_A_CORE
Rec'd return call from Dave Acevedo of [facility] (tel. [phone]/ fax: [phone]) and discussed patient's current treatment. Their team does not feel that pt is reliable enough with taking his medications and with the follow up bloodwork to be  maintained on Clozaril. They are requesting the resumption of pt's previous medication regimen, and that if pt is serious about going on Clozaril and really understands what that entails they will work with him toward that goal on an outpatient basis. Discussed with Enrrique who discussed with patient. Pt will be discharged on Friday per his request, on the medication he was previously being prescribed. The ACT team will make a home visit to patient on Friday afternoon.
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
## 2019-05-23 NOTE — DISCHARGE NOTE BEHAVIORAL HEALTH - PAST PSYCHIATRIC HISTORY
Patient with long history of psychiatric illness with multiple (>100) admissions for same. He has an active AOT order and is in outpatient treatment with the [facility].

## 2019-05-23 NOTE — DISCHARGE NOTE BEHAVIORAL HEALTH - FAMILY HISTORY OF PSYCHIATRIC ILLNESS
Pt resides in a furnished room with his mother. He is unemployed, has benefits in place. His mother is actively in treatment for mental illness. He has a cousin who was a Heroin addict, completed suicide with use of firearm.

## 2019-05-23 NOTE — DISCHARGE NOTE BEHAVIORAL HEALTH - NSBHDCSWCOMMENTSFT_PSY_A_CORE
Pt educated on the signs and symptoms of mental illness and addiction, the need and resources for continuing in appropriate level of psychiatric outpatient treatment and the need to continue taking the above medications as prescribed unless directed otherwise by his outpatient provider.

## 2019-05-23 NOTE — DISCHARGE NOTE BEHAVIORAL HEALTH - NSBHDCCONDITIONFT_PSY_A_CORE
Patient saw improvement of auditory hallucination that were bothersome. Patient has no suicidal ideation. Patient has no homicidal ideation. Patient has baseline paranoia of being followed. Patient is not manic or acutely psychotic. Patient has appropriate follow-up treatment with ACT Team. Patient symptoms not indicating imminent risk for harm to self; not warranting involuntary in-patient hospitalization.

## 2019-05-23 NOTE — DISCHARGE NOTE BEHAVIORAL HEALTH - NSTOBACCOREFERRAL_GEN_A_CS
Patient declined information/Pt refused referral for smoking cessation treatment at time of admission and at discharge. Yes/Pt refused referral for smoking cessation treatment at time of admission and at discharge.

## 2019-05-23 NOTE — DISCHARGE NOTE BEHAVIORAL HEALTH - NSBHDCMEDICALFT_PSY_A_CORE
thiamine 100 milliGRAM(s) Oral daily  folic acid 1 milliGRAM(s) Oral daily  multivitamin 1 Tablet(s) Oral daily

## 2019-05-23 NOTE — DISCHARGE NOTE BEHAVIORAL HEALTH - NSBHDCALCOHOLREFERFT_PSY_A_CORE
Pt will have his substance abuse issues addressed in treatment with  the [facility]

## 2019-05-23 NOTE — DISCHARGE NOTE BEHAVIORAL HEALTH - NSBHDCSUBSTHXFT_PSY_A_CORE
Patient is an active substance abuser with current use of alcohol, crack/cocaine and positive history of withdrawal symptoms.

## 2019-05-23 NOTE — DISCHARGE NOTE BEHAVIORAL HEALTH - NSBHDCPURPOSE1FT_PSY_A_CORE
Pt has an appt for a home visit by the [facility] team on Friday 5/24/2019 at 3:00PM for psychiatric  outpatient treatment.

## 2019-05-24 VITALS — TEMPERATURE: 98 F | RESPIRATION RATE: 14 BRPM | OXYGEN SATURATION: 100 %

## 2019-05-24 LAB — LITHIUM SERPL-MCNC: 0.8 MMOL/L — SIGNIFICANT CHANGE UP (ref 0.6–1.2)

## 2019-05-24 PROCEDURE — 99232 SBSQ HOSP IP/OBS MODERATE 35: CPT

## 2019-05-24 RX ORDER — OLANZAPINE 15 MG/1
1 TABLET, FILM COATED ORAL
Qty: 15 | Refills: 1
Start: 2019-05-24 | End: 2019-06-22

## 2019-05-24 RX ORDER — THIAMINE MONONITRATE (VIT B1) 100 MG
1 TABLET ORAL
Qty: 0 | Refills: 0 | DISCHARGE
Start: 2019-05-24

## 2019-05-24 RX ORDER — TRIHEXYPHENIDYL HCL 2 MG
1 TABLET ORAL
Qty: 30 | Refills: 1
Start: 2019-05-24 | End: 2019-06-22

## 2019-05-24 RX ORDER — GABAPENTIN 400 MG/1
700 CAPSULE ORAL
Qty: 0 | Refills: 0 | DISCHARGE

## 2019-05-24 RX ORDER — NICOTINE POLACRILEX 2 MG
1 GUM BUCCAL
Qty: 30 | Refills: 1
Start: 2019-05-24 | End: 2019-07-22

## 2019-05-24 RX ORDER — IPRATROPIUM/ALBUTEROL SULFATE 18-103MCG
3 AEROSOL WITH ADAPTER (GRAM) INHALATION
Qty: 0 | Refills: 0 | DISCHARGE
Start: 2019-05-24

## 2019-05-24 RX ORDER — LITHIUM CARBONATE 300 MG/1
1 TABLET, EXTENDED RELEASE ORAL
Qty: 30 | Refills: 1
Start: 2019-05-24 | End: 2019-06-22

## 2019-05-24 RX ORDER — FOLIC ACID 0.8 MG
1 TABLET ORAL
Qty: 0 | Refills: 0 | DISCHARGE
Start: 2019-05-24

## 2019-05-24 RX ADMIN — Medication 2 MILLIGRAM(S): at 09:25

## 2019-05-24 RX ADMIN — HALOPERIDOL DECANOATE 5 MILLIGRAM(S): 100 INJECTION INTRAMUSCULAR at 09:24

## 2019-05-24 RX ADMIN — LITHIUM CARBONATE 300 MILLIGRAM(S): 300 TABLET, EXTENDED RELEASE ORAL at 09:24

## 2019-05-24 RX ADMIN — Medication 1 TABLET(S): at 09:24

## 2019-05-24 RX ADMIN — Medication 2 MILLIGRAM(S): at 09:24

## 2019-05-24 RX ADMIN — Medication 100 MILLIGRAM(S): at 09:24

## 2019-05-24 RX ADMIN — Medication 1 MILLIGRAM(S): at 09:24

## 2019-05-24 NOTE — PROGRESS NOTE BEHAVIORAL HEALTH - NSBHCHARTREVIEWINVESTIGATE_PSY_A_CORE FT
< from: 12 Lead ECG (05.17.19 @ 20:30) >    Ventricular Rate 80 BPM    Atrial Rate 80 BPM    P-R Interval 184 ms    QRS Duration 100 ms    Q-T Interval 374 ms    QTC Calculation(Bezet) 431 ms    P Axis 43 degrees    R Axis 75 degrees    T Axis 36 degrees    Diagnosis Line Normal sinus rhythm  Normal ECG
< from: 12 Lead ECG (05.17.19 @ 20:30) >    Ventricular Rate 80 BPM    Atrial Rate 80 BPM    P-R Interval 184 ms    QRS Duration 100 ms    Q-T Interval 374 ms    QTC Calculation(Bezet) 431 ms    P Axis 43 degrees    R Axis 75 degrees    T Axis 36 degrees    Diagnosis Line Normal sinus rhythm  Normal ECG
Ventricular Rate 80 BPM    Atrial Rate 80 BPM    P-R Interval 184 ms    QRS Duration 100 ms    Q-T Interval 374 ms    QTC Calculation(Bezet) 431 ms    P Axis 43 degrees    R Axis 75 degrees    T Axis 36 degrees
< from: 12 Lead ECG (05.17.19 @ 20:30) >    Ventricular Rate 80 BPM    Atrial Rate 80 BPM    P-R Interval 184 ms    QRS Duration 100 ms    Q-T Interval 374 ms    QTC Calculation(Bezet) 431 ms    P Axis 43 degrees    R Axis 75 degrees    T Axis 36 degrees    Diagnosis Line Normal sinus rhythm  Normal ECG

## 2019-05-24 NOTE — PROGRESS NOTE BEHAVIORAL HEALTH - PROBLEM SELECTOR PLAN 2
CIWA  Folic acid daily  Thiamine 100 mg daily  MV daily

## 2019-05-24 NOTE — PROGRESS NOTE BEHAVIORAL HEALTH - NSBHFUPINTERVALHXFT_PSY_A_CORE
Patient is back on Zyprexa 20 mg at bedtime as requested by ACT team; denying side effects; no side effects observed. Denies disturbances in sleep / appetite. Denies depression, hopelessness, suicidal ideation. Denies manic / psychotic symptoms. Reports mild paranoia, that everyone is following him, but baseline. No delusions elicited.   Haldol Decanoate 200 mg IM on 5/30/19.

## 2019-05-24 NOTE — PROGRESS NOTE BEHAVIORAL HEALTH - AFFECT QUALITY
Depressed/Anxious/Irritable
Irritable/Depressed/Anxious
Anxious/Euthymic
Anxious/Euthymic
Anxious/Depressed/Irritable
Depressed/Irritable/Anxious
Anxious/Irritable/Depressed

## 2019-05-24 NOTE — PROGRESS NOTE BEHAVIORAL HEALTH - THOUGHT CONTENT
Delusions/Ideas of reference
Delusions/Ideas of reference
Delusions/Other
Delusions
Ideas of reference/Delusions
Delusions
Delusions/Ideas of reference

## 2019-05-24 NOTE — PROGRESS NOTE BEHAVIORAL HEALTH - THOUGHT PROCESS
Impaired reasoning/Overinclusive/Circumstantial/Illogical/Disorganized
Disorganized/Overinclusive/Circumstantial/Illogical/Impaired reasoning
Linear
Linear
Overinclusive/Disorganized/Circumstantial/Illogical/Impaired reasoning
Impaired reasoning
Disorganized/Overinclusive/Circumstantial/Impaired reasoning

## 2019-05-24 NOTE — PROGRESS NOTE BEHAVIORAL HEALTH - PROBLEM SELECTOR PROBLEM 1
Paranoid schizophrenia

## 2019-05-24 NOTE — PROGRESS NOTE BEHAVIORAL HEALTH - NSBHATTESTSEENBY_PSY_A_CORE
attending Psychiatrist without NP/Trainee
attending Psychiatrist without NP/Trainee
NP with telephonic supervision from Attending Psychiatrist
attending Psychiatrist without NP/Trainee

## 2019-05-24 NOTE — PROGRESS NOTE BEHAVIORAL HEALTH - RISK ASSESSMENT
HIGH RISK FOR HARM TO SELF    RISK FACTORS: , substance abuse, history of aggression and SI, history of noncompliance, legal abuse, trauma, history of suicide attempt, command auditory hallucinations, history of multiple in-patient hospitalizations    PROTECTIVE FACTORS: medication compliance, knowledge about medications, motivation for treatment, on AOT, help seeking behaviors
Risk factor, substance abuse, h/o aggression and SI, h/o noncompliance, legal abuse, trauma, SA, white.  Protective factors: on AOT with ACT team.  Help seeking.
HIGH RISK FOR HARM TO SELF    RISK FACTORS: , substance abuse, history of aggression and SI, history of noncompliance, legal abuse, trauma, history of suicide attempt, command auditory hallucinations, history of multiple in-patient hospitalizations    PROTECTIVE FACTORS: medication compliance, knowledge about medications, motivation for treatment, on AOT, help seeking behaviors
Risk factor, substance abuse, h/o aggression and SI, h/o noncompliance, legal abuse, trauma, SA, white.  Protective factors: on AOT with ACT team.  Help seeking.
Risk factor, substance abuse, h/o aggression and SI, h/o noncompliance, legal abuse, trauma, SA, white.  Protective factors: on AOT with ACT team.  Help seeking.

## 2019-05-24 NOTE — PROGRESS NOTE BEHAVIORAL HEALTH - SECONDARY DX3
Cannabis abuse

## 2019-05-24 NOTE — PROGRESS NOTE BEHAVIORAL HEALTH - NSBHADMITIPOBSFT_PSY_A_CORE
Routine Observation

## 2019-05-24 NOTE — PROGRESS NOTE BEHAVIORAL HEALTH - PERCEPTIONS
Auditory hallucinations
Auditory hallucinations
No abnormalities
No abnormalities
Auditory hallucinations
No abnormalities
Auditory hallucinations

## 2019-05-24 NOTE — PROGRESS NOTE BEHAVIORAL HEALTH - PRIMARY DX
Paranoid schizophrenia
Paranoid schizophrenia
Psychosis, unspecified psychosis type
Paranoid schizophrenia
Paranoid schizophrenia
Psychosis, unspecified psychosis type
Paranoid schizophrenia

## 2019-05-24 NOTE — PROGRESS NOTE BEHAVIORAL HEALTH - NS ED BHA MSE SPEECH RATE
Fast, difficult to interrupt
Normal
Normal
Fast, difficult to interrupt

## 2019-05-24 NOTE — PROGRESS NOTE BEHAVIORAL HEALTH - NSBHADMITIPBHPROVFT_PSY_A_CORE
Dave Acevedo - Dave Acevedo - [phone] - who reports not wanting patient being on Clozaril and wants patient back on Zyprexa, and will get Haldol Decanoate 200 mg IM on 5.30.
Dave Acevedo - Dave Acevedo - [phone] - who reports not wanting patient being on Clozaril and wants patient back on Zyprexa, and will get Haldol Decanoate 200 mg IM on 5.30.
Dave Acevedo - Spoke to Dave Acevedo - [phone] - who reports not wanting patient being on Clozaril and wants patient back on Zyprexa, and will get Haldol Decanoate 200 mg IM on 5.30. Willing to meet with patient Friday to discuss Clozaril. Patient notified and agreeable, although resisting initially. Clozapine will be tapered off and Zyprexa re-titrated to initial 20 mg dosage.

## 2019-05-24 NOTE — PROGRESS NOTE BEHAVIORAL HEALTH - MOOD
Depressed/Anxious/Irritable
Depressed/Anxious/Irritable
Normal
Normal
Depressed/Anxious/Irritable
Irritable/Anxious
Anxious/Irritable

## 2019-05-24 NOTE — PROGRESS NOTE BEHAVIORAL HEALTH - NSBHFUPINTERVALCCFT_PSY_A_CORE
" I'm paranoid, and I see aliens "
"the voices are still there but they are improving."
I'm ok
" I'm paranoid, and I see aliens "
I'm good; I want to leave on Friday
I'm ok
" I'm paranoid, and I see aliens "
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
## 2019-05-24 NOTE — PROGRESS NOTE BEHAVIORAL HEALTH - NSBHADMITMEDEDUDETAILS_A_CORE FT
Discussed risks/benefits

## 2019-05-24 NOTE — PROGRESS NOTE BEHAVIORAL HEALTH - SECONDARY DX1
Alcoholic intoxication without complication

## 2019-05-26 NOTE — CHART NOTE - NSCHARTNOTEFT_GEN_A_CORE
Left voice message x2 today. Awaiting return phone call. If no return call letter will be sent to address on file.

## 2019-05-29 DIAGNOSIS — Z62.810 PERSONAL HISTORY OF PHYSICAL AND SEXUAL ABUSE IN CHILDHOOD: ICD-10-CM

## 2019-05-29 DIAGNOSIS — F17.210 NICOTINE DEPENDENCE, CIGARETTES, UNCOMPLICATED: ICD-10-CM

## 2019-05-29 DIAGNOSIS — R45.851 SUICIDAL IDEATIONS: ICD-10-CM

## 2019-05-29 DIAGNOSIS — D63.8 ANEMIA IN OTHER CHRONIC DISEASES CLASSIFIED ELSEWHERE: ICD-10-CM

## 2019-05-29 DIAGNOSIS — R01.1 CARDIAC MURMUR, UNSPECIFIED: ICD-10-CM

## 2019-05-29 DIAGNOSIS — F14.10 COCAINE ABUSE, UNCOMPLICATED: ICD-10-CM

## 2019-05-29 DIAGNOSIS — F20.0 PARANOID SCHIZOPHRENIA: ICD-10-CM

## 2019-05-29 DIAGNOSIS — F12.10 CANNABIS ABUSE, UNCOMPLICATED: ICD-10-CM
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
## 2019-05-29 DIAGNOSIS — F10.120 ALCOHOL ABUSE WITH INTOXICATION, UNCOMPLICATED: ICD-10-CM

## 2019-05-29 DIAGNOSIS — J45.909 UNSPECIFIED ASTHMA, UNCOMPLICATED: ICD-10-CM

## 2019-05-29 DIAGNOSIS — F41.9 ANXIETY DISORDER, UNSPECIFIED: ICD-10-CM
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
## 2021-04-12 ENCOUNTER — EMERGENCY (EMERGENCY)
Facility: HOSPITAL | Age: 41
LOS: 0 days | Discharge: HOME | End: 2021-04-13
Attending: EMERGENCY MEDICINE
Payer: MEDICAID

## 2021-04-12 VITALS — WEIGHT: 220.02 LBS | HEIGHT: 71 IN

## 2021-04-12 DIAGNOSIS — Z81.8 FAMILY HISTORY OF OTHER MENTAL AND BEHAVIORAL DISORDERS: ICD-10-CM

## 2021-04-12 DIAGNOSIS — J45.909 UNSPECIFIED ASTHMA, UNCOMPLICATED: ICD-10-CM

## 2021-04-12 DIAGNOSIS — J43.9 EMPHYSEMA, UNSPECIFIED: ICD-10-CM

## 2021-04-12 DIAGNOSIS — R01.1 CARDIAC MURMUR, UNSPECIFIED: ICD-10-CM

## 2021-04-12 DIAGNOSIS — F12.90 CANNABIS USE, UNSPECIFIED, UNCOMPLICATED: ICD-10-CM

## 2021-04-12 DIAGNOSIS — F32.9 MAJOR DEPRESSIVE DISORDER, SINGLE EPISODE, UNSPECIFIED: ICD-10-CM

## 2021-04-12 DIAGNOSIS — R45.851 SUICIDAL IDEATIONS: ICD-10-CM

## 2021-04-12 DIAGNOSIS — F20.0 PARANOID SCHIZOPHRENIA: ICD-10-CM

## 2021-04-12 DIAGNOSIS — F14.10 COCAINE ABUSE, UNCOMPLICATED: ICD-10-CM

## 2021-04-12 DIAGNOSIS — F41.9 ANXIETY DISORDER, UNSPECIFIED: ICD-10-CM

## 2021-04-12 LAB
ANION GAP SERPL CALC-SCNC: 7 MMOL/L — SIGNIFICANT CHANGE UP (ref 5–17)
APAP SERPL-MCNC: <2 UG/ML — LOW (ref 10–30)
BASOPHILS # BLD AUTO: 0.11 K/UL — SIGNIFICANT CHANGE UP (ref 0–0.2)
BASOPHILS NFR BLD AUTO: 1 % — SIGNIFICANT CHANGE UP (ref 0–2)
BUN SERPL-MCNC: 6 MG/DL — LOW (ref 7–23)
CALCIUM SERPL-MCNC: 11.2 MG/DL — HIGH (ref 8.5–10.1)
CHLORIDE SERPL-SCNC: 110 MMOL/L — HIGH (ref 96–108)
CO2 SERPL-SCNC: 26 MMOL/L — SIGNIFICANT CHANGE UP (ref 22–31)
CREAT SERPL-MCNC: 1.02 MG/DL — SIGNIFICANT CHANGE UP (ref 0.5–1.3)
EOSINOPHIL # BLD AUTO: 0.37 K/UL — SIGNIFICANT CHANGE UP (ref 0–0.5)
EOSINOPHIL NFR BLD AUTO: 3.4 % — SIGNIFICANT CHANGE UP (ref 0–6)
ETHANOL SERPL-MCNC: 179 MG/DL — HIGH (ref 0–10)
GLUCOSE SERPL-MCNC: 93 MG/DL — SIGNIFICANT CHANGE UP (ref 70–99)
HCT VFR BLD CALC: 39.9 % — SIGNIFICANT CHANGE UP (ref 39–50)
HGB BLD-MCNC: 13.1 G/DL — SIGNIFICANT CHANGE UP (ref 13–17)
IMM GRANULOCYTES NFR BLD AUTO: 0.3 % — SIGNIFICANT CHANGE UP (ref 0–1.5)
LYMPHOCYTES # BLD AUTO: 2.3 K/UL — SIGNIFICANT CHANGE UP (ref 1–3.3)
LYMPHOCYTES # BLD AUTO: 20.9 % — SIGNIFICANT CHANGE UP (ref 13–44)
MCHC RBC-ENTMCNC: 30.1 PG — SIGNIFICANT CHANGE UP (ref 27–34)
MCHC RBC-ENTMCNC: 32.8 GM/DL — SIGNIFICANT CHANGE UP (ref 32–36)
MCV RBC AUTO: 91.7 FL — SIGNIFICANT CHANGE UP (ref 80–100)
MONOCYTES # BLD AUTO: 0.52 K/UL — SIGNIFICANT CHANGE UP (ref 0–0.9)
MONOCYTES NFR BLD AUTO: 4.7 % — SIGNIFICANT CHANGE UP (ref 2–14)
NEUTROPHILS # BLD AUTO: 7.67 K/UL — HIGH (ref 1.8–7.4)
NEUTROPHILS NFR BLD AUTO: 69.7 % — SIGNIFICANT CHANGE UP (ref 43–77)
PCP SPEC-MCNC: SIGNIFICANT CHANGE UP
PLATELET # BLD AUTO: 327 K/UL — SIGNIFICANT CHANGE UP (ref 150–400)
POTASSIUM SERPL-MCNC: 3.3 MMOL/L — LOW (ref 3.5–5.3)
POTASSIUM SERPL-SCNC: 3.3 MMOL/L — LOW (ref 3.5–5.3)
RBC # BLD: 4.35 M/UL — SIGNIFICANT CHANGE UP (ref 4.2–5.8)
RBC # FLD: 13.1 % — SIGNIFICANT CHANGE UP (ref 10.3–14.5)
SALICYLATES SERPL-MCNC: 1.9 MG/DL — LOW (ref 2.8–20)
SODIUM SERPL-SCNC: 143 MMOL/L — SIGNIFICANT CHANGE UP (ref 135–145)
WBC # BLD: 11 K/UL — HIGH (ref 3.8–10.5)
WBC # FLD AUTO: 11 K/UL — HIGH (ref 3.8–10.5)

## 2021-04-12 PROCEDURE — 93010 ELECTROCARDIOGRAM REPORT: CPT

## 2021-04-12 PROCEDURE — 0225U NFCT DS DNA&RNA 21 SARSCOV2: CPT

## 2021-04-12 PROCEDURE — 80048 BASIC METABOLIC PNL TOTAL CA: CPT

## 2021-04-12 PROCEDURE — 80307 DRUG TEST PRSMV CHEM ANLYZR: CPT

## 2021-04-12 PROCEDURE — 93005 ELECTROCARDIOGRAM TRACING: CPT

## 2021-04-12 PROCEDURE — 99285 EMERGENCY DEPT VISIT HI MDM: CPT

## 2021-04-12 PROCEDURE — 36415 COLL VENOUS BLD VENIPUNCTURE: CPT

## 2021-04-12 PROCEDURE — 85025 COMPLETE CBC W/AUTO DIFF WBC: CPT

## 2021-04-12 NOTE — ED ADULT NURSE NOTE - NSIMPLEMENTINTERV_GEN_ALL_ED
Implemented All Universal Safety Interventions:  [place] to call system. Call bell, personal items and telephone within reach. Instruct patient to call for assistance. Room bathroom lighting operational. Non-slip footwear when patient is off stretcher. Physically safe environment: no spills, clutter or unnecessary equipment. Stretcher in lowest position, wheels locked, appropriate side rails in place.

## 2021-04-12 NOTE — ED PROVIDER NOTE - NSFOLLOWUPINSTRUCTIONS_ED_ALL_ED_FT
FOLLOW UP WITH PMD & PSYCHIATRIST  WITHIN 1-2DAYS, CALL TO MAKE APPOINTMENT  COME BACK TO ED IF YOUR CONDITION WORSENS OR IF YOU DEVELOP FEVER GREATER THAN 100.4F, CHEST PAIN,  SHORTNESS OF BREATH OR ANY OTHER SYMPTOMS CONCERNING TO YOU  TAKE TYLENOL (ACETAMINOPHEN) 650 MG EVERY 6 HOURS AS NEEDED FOR PAIN    IF YOU WERE PRESRCIBED ANY MEDICATIONS FROM TODAY'S VISIT, TAKE THEM AS DIRECTED

## 2021-04-12 NOTE — ED PROVIDER NOTE - CONSTITUTIONAL, MLM
Unkempt, disheveled, awake, alert, oriented to person, place, time/situation and in no apparent distress. normal...

## 2021-04-12 NOTE — ED ADULT NURSE NOTE - OBJECTIVE STATEMENT
pt presents to ED with complaints of paranoia with suicidal thoughts pt appears to be unkept with long fingernails and dirty clothes on

## 2021-04-12 NOTE — ED PROVIDER NOTE - PROGRESS NOTE DETAILS
Elsie BOB: Spoke with patient who is disorganized. ETOH elevated and telepsych will not see patient. Pending etoh under 100 at midnight. Will sign out to Dr. Aguayo f/u sobriety to see telepsych. Attending Dede, Pt states he is paranoid and has SI and anxiety.  Plan psych consult. Attending Dede, Pt states he is paranoid and has SI and anxiety.  Plan psych consult.  pt has no one that can contact as collateral Attending balbina Aguayo/steffen yoo for telepsych eval. jud; psych np cleared pt for d/c Davi Antunez: pt endorsed to me from prior physician PENDING: psychiatry evaluation for dispo. pt medically cleared.

## 2021-04-12 NOTE — ED ADULT NURSE NOTE - HPI (INCLUDE ILLNESS QUALITY, SEVERITY, DURATION, TIMING, CONTEXT, MODIFYING FACTORS, ASSOCIATED SIGNS AND SYMPTOMS)
pt presents to ED with complaints of paranoia with suicidal thoughts pt appears to be unkept pt denies any recent drug use hx of cocaine use

## 2021-04-12 NOTE — ED PROVIDER NOTE - NS_ ATTENDINGSCRIBEDETAILS _ED_A_ED_FT
I, Eliud Zimmerman MD,  performed the initial face to face bedside interview with this patient regarding history of present illness, review of symptoms and relevant past medical, social and family history.  I completed an independent physical examination.    The history, relevant review of systems, past medical and surgical history, medical decision making, and physical examination was documented by the scribe in my presence and I attest to the accuracy of the documentation.

## 2021-04-12 NOTE — ED PROVIDER NOTE - OBJECTIVE STATEMENT
42 y/o male with a PMHx of anxiety, asthma, depression, emphysema, paranoid behavior, SI presents to the ED for paranoia and SI.

## 2021-04-12 NOTE — ED PROVIDER NOTE - PATIENT PORTAL LINK FT
You can access the FollowMyHealth Patient Portal offered by [facility] by registering at the following website: [email or web address]. By joining [facility]’s FollowMyHealth portal, you will also be able to view your health information using other applications (apps) compatible with our system.

## 2021-04-13 VITALS
SYSTOLIC BLOOD PRESSURE: 139 MMHG | TEMPERATURE: 99 F | RESPIRATION RATE: 17 BRPM | HEART RATE: 87 BPM | OXYGEN SATURATION: 99 % | DIASTOLIC BLOOD PRESSURE: 98 MMHG

## 2021-04-13 DIAGNOSIS — F20.0 PARANOID SCHIZOPHRENIA: ICD-10-CM

## 2021-04-13 LAB
RAPID RVP RESULT: SIGNIFICANT CHANGE UP
SARS-COV-2 RNA SPEC QL NAA+PROBE: SIGNIFICANT CHANGE UP

## 2021-04-13 RX ORDER — POTASSIUM CHLORIDE 20 MEQ
40 PACKET (EA) ORAL ONCE
Refills: 0 | Status: COMPLETED | OUTPATIENT
Start: 2021-04-13 | End: 2021-04-13

## 2021-04-13 RX ADMIN — Medication 1 MILLIGRAM(S): at 10:26
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
## 2021-04-13 NOTE — ED ADULT NURSE REASSESSMENT NOTE - NS ED NURSE REASSESS COMMENT FT1
received report from KRISTINA Fox. pt a/ox3, on 1:1. awaiting psych, pt reports "feeling anxious", denies SI/HI, auditory and visual hallucinations. pt wanded, belongings with security. pt safety maintained. pt aware of tx plan.

## 2021-04-13 NOTE — ED BEHAVIORAL HEALTH NOTE - BEHAVIORAL HEALTH NOTE
Consult requested by EM Attending Dr. Aguayo at 4:56. Telepsychiatry attempted to consult at 6:31 but unable to initiate due to patient not being set up with Telepsychiatry device. ED staff aware.

## 2021-04-13 NOTE — ED BEHAVIORAL HEALTH ASSESSMENT NOTE - DESCRIPTION
single, lives in room with mother, multiple arrents and incarcerations, Attended [employer], watching TV, no acute distress or behavioral issues. Heart Murmur, Fx rib age32

## 2021-04-13 NOTE — ED BEHAVIORAL HEALTH NOTE - BEHAVIORAL HEALTH NOTE
===================  PRE-HOSPITAL COURSE  ===================  SOURCE:  Unable to reach RN for ed-course.    COVID Exposure Screen- Patient  1.	*Have you had a COVID-19 test in the last 90 days?  (  ) Yes   (  ) No   ( X ) Unknown- Reason: Unable to reach RN for ed-course.  IF YES PROCEED TO QUESTION #2. IF NO OR UNKNOWN, PLEASE SKIP TO QUESTION #3.  2.	Date of test(s) and result(s): ________  3.	*Have you tested positive for COVID-19 antibodies? (  ) Yes   (  ) No   ( x ) Unknown- Reason: Unable to reach RN for ed-course.  IF YES PROCEED TO QUESTION #4. IF NO or UNKNOWN, PLEASE SKIP TO QUESTION #5.  4.	Date of positive antibody test: ________  5.	*Have you received 2 doses of the COVID-19 vaccine? (  ) Yes   (  ) No   ( x ) Unknown- Reason: Unable to reach RN for ed-course.  IF YES PROCEED TO QUESTION #6. IF NO or UNKNOWN, PLEASE SKIP TO QUESTION #7.  6.	Date of second dose: ________  7.	*In the past 10 days, have you been around anyone with a positive COVID-19 test?* (  ) Yes   (  ) No   ( x ) Unknown- Reason: Unable to reach RN for ed-course.  IF YES PROCEED TO QUESTION #8. IF NO or UNKNOWN, PLEASE SKIP TO QUESTION #13.  8.	Were you within 6 feet of them for at least 15 minutes? (  ) Yes   (  ) No   (  ) Unknown- Reason: _____  9.	Have you provided care for them? (  ) Yes   (  ) No   (  ) Unknown- Reason: ______  10.	Have you had direct physical contact with them (touched, hugged, or kissed them)? (  ) Yes   (  ) No    (  ) Unknown- Reason: _____  11.	Have you shared eating or drinking utensils with them? (  ) Yes   (  ) No    (  ) Unknown- Reason: ____  12.	Have they sneezed, coughed, or somehow gotten respiratory droplets on you? (  ) Yes   (  ) No    (  ) Unknown- Reason: ______  13.	*Have you been out of [place] within the past 10 days?* (  ) Yes   (  ) No   ( x ) Unknown- Reason: Unable to reach RN for ed-course.  IF YES PLEASE ANSWER THE FOLLOWING QUESTIONS:  14.	Which state/country have you been to? ______  15.	Were you there over 24 hours? (  ) Yes   (  ) No    (  ) Unknown- Reason: ______  16.	Date of return to [facility]: ______  ========================  FOR EACH COLLATERAL  ========================  NAME:  Dave Curtis of [facility]     NUMBER: [phone].    RELATIONSHIP: ACT Team.     COMMENTS: Attempted to contact without success.  was left requesting a call back.     COVID Exposure Screen- Patient  1.	*Have you had a COVID-19 test in the last 90 days?  (  ) Yes   (  ) No   ( X ) Unknown- Reason: Unable to reach collateral.   IF YES PROCEED TO QUESTION #2. IF NO OR UNKNOWN, PLEASE SKIP TO QUESTION #3.  2.	Date of test(s) and result(s): ________  3.	*Have you tested positive for COVID-19 antibodies? (  ) Yes   ( X ) No   (  ) Unknown- Reason: Unable to reach collateral.  IF YES PROCEED TO QUESTION #4. IF NO or UNKNOWN, PLEASE SKIP TO QUESTION #5.  4.	Date of positive antibody test: ________  5.	*Have you received 2 doses of the COVID-19 vaccine? (  ) Yes   (  ) No   (X  ) Unknown- Reason: Unable to reach collateral.  IF YES PROCEED TO QUESTION #6. IF NO or UNKNOWN, PLEASE SKIP TO QUESTION #7.  6.	Date of second dose: ________  7.	*In the past 10 days, have you been around anyone with a positive COVID-19 test?* (  ) Yes   (  ) No   (X  ) Unknown- Reason: Unable to reach collateral.  IF YES PROCEED TO QUESTION #8. IF NO or UNKNOWN, PLEASE SKIP TO QUESTION #13.  8.	Were you within 6 feet of them for at least 15 minutes? (  ) Yes   (  ) No   (  ) Unknown- Reason: _____  9.	Have you provided care for them? (  ) Yes   (  ) No   (  ) Unknown- Reason: ______  10.	Have you had direct physical contact with them (touched, hugged, or kissed them)? (  ) Yes   (  ) No    (  ) Unknown- Reason: _____  11.	Have you shared eating or drinking utensils with them? (  ) Yes   (  ) No    (  ) Unknown- Reason: ____  12.	Have they sneezed, coughed, or somehow gotten respiratory droplets on you? (  ) Yes   (  ) No    ( X ) Unknown- Reason: ______  13.	*Have you been out of [place] within the past 10 days?* (  ) Yes   (  ) No   (X  ) Unknown- Reason: Unable to reach collateral.  IF YES PLEASE ANSWER THE FOLLOWING QUESTIONS:  14.	Which state/country have you been to? ______  15.	Were you there over 24 hours? (  ) Yes   (  ) No    (  ) Unknown- Reason: ______  16.	Date of return to [facility]: ______
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
## 2021-04-13 NOTE — ED BEHAVIORAL HEALTH ASSESSMENT NOTE - CURRENT MEDICATION
Haldol 200 mg SUE 4.7.2021  Last documented medications: Zyprexa 15 mg am and 20 mg hs, Artane 2mg am and 2 mg hs, Lithium 300 q12, Neurontin 700 am and 700 pm

## 2021-04-13 NOTE — ED BEHAVIORAL HEALTH ASSESSMENT NOTE - OTHER PAST PSYCHIATRIC HISTORY (INCLUDE DETAILS REGARDING ONSET, COURSE OF ILLNESS, INPATIENT/OUTPATIENT TREATMENT)
ACT Team [place]??, message left for return call    (Pt reported was [facility] but they have no record of him there)  [place] noted as contact who was contacted bu number updated and no longer in chart.  >100 psych admissions including 1 year at [place] in 2016, now on AOT

## 2022-12-09 NOTE — ED PROVIDER NOTE - WET READ LAUNCH FT
There are no Wet Read(s) to document. Azathioprine Pregnancy And Lactation Text: This medication is Pregnancy Category D and isn't considered safe during pregnancy. It is unknown if this medication is excreted in breast milk.

## 2023-04-17 NOTE — DISCHARGE NOTE BEHAVIORAL HEALTH - MEDICATION SUMMARY - MEDICATIONS TO TAKE
Spiritual Plan of Care    Pt Name: Katy Diaz  Pt : 1935  Date: 2023    Visit Type: In person    Referral Source: Patient    Reason for Visit: Consult, Sacramental/Communion    Visited With: Patient    Length of Visit: 25 minutes    Requires Follow-up: No    Spiritual Care Consult Needed: Spiritual Care eval completed    Spiritual Care Visit Preference:     Taxonomy:    · Intended Effects: Establish rapport and connectedness, Aligning care plan with patient's values, Preserve dignity and respect  · Methods: Collaborate with care team member, Assist with spiritual/Tenriism practices, Offer spiritual/Tenriism support  · Interventions: Active listening, Incorporate cultural and Tenriism needs in plan of care, [place], Perform a Tenriism rite or ritual      Patient Affect at Time of Visit: Pleasant, Cooperative, Tired  Patient Assessment: Relies on melva, Coping , Relieved  Patient  Intervention: Sacramental/Ritual, Affirmation, Prayer, Reassurance, Anointing    Spiritual Plan of Care: Follow up if requested    Patient Reported Outcome:  Increased sense of hope    Met with pt. (Katy) following referral for [facility] Sacrament of Anointing for the Sick, conveyed by Katy's nurse. Katy is an 86 yo female, under care for Anemia and respiratory ailments.    Sacramental ministry provided at bedside by Fr. Erich Guerrero, who in addition to being a  is also serving as a  Intern.     Fr. Jung provided anointing and prayers with Katy after which she appeared visibly relieved and much comforted. Katy identifies as Mandaen but is not presently connected with a local paris.     Chaplaincy will continue to follow supportively while Katy remains hospitalized and remains available to respond to needs and/or requests from Katy, her loved ones, and/or other members of her care team.   I will START or STAY ON the medications listed below when I get home from the hospital:    lithium 300 mg oral capsule  -- 1 cap(s) by mouth 2 times a day  -- Indication: For Mood disorder    trihexyphenidyl 2 mg oral tablet  -- 1 tab(s) by mouth 2 times a day  -- Indication: For EPS    haloperidol decanoate  -- 200 milligram(s) by mouth every 3 weeks  -- Indication: For Schizophrenia    OLANZapine 20 mg oral tablet  -- 1 tab(s) by mouth once a day (at bedtime)  -- Indication: For Schizophrenia    ipratropium-albuterol 0.5 mg-2.5 mg/3 mLinhalation solution  -- 3 milliliter(s) inhaled every 6 hours, As needed, Shortness of Breath and/or Wheezing  -- Indication: For Shortness of breathe    nicotine 21 mg/24 hr transdermal film, extended release  -- 1 patch by transdermal patch every 24 hours   -- Indication: For Nicotine Replacement    Multiple Vitamins oral tablet  -- 1 tab(s) by mouth once a day  -- Indication: For Nutrition    folic acid 1 mg oral tablet  -- 1 tab(s) by mouth once a day  -- Indication: For Nutrition    thiamine 100 mg oral tablet  -- 1 tab(s) by mouth once a day  -- Indication: For Nutrition

## 2023-07-20 NOTE — ED PROVIDER NOTE - MUSCULOSKELETAL, MLM
Requested Prescriptions     Pending Prescriptions Disp Refills    ROPINIROLE 1 MG Oral Tab [Pharmacy Med Name: rOPINIRole HCl 1 MG Oral Tablet] 90 tablet 0     Sig: TAKE 1 TABLET BY MOUTH NIGHTLY AT BEDTIME     Last refill 5/1/23 #90  LOV 2/27/23  Future Appointments   Date Time Provider James Jensen   8/2/2023 11:15 AM Zoe Li MD [id number]   8/14/2023 10:50 AM [facility] [place]   12/6/2023 11:40 AM Marisol Barry MD [facility] [place] Spine appears normal, range of motion is not limited, no muscle or joint tenderness
